# Patient Record
Sex: FEMALE | Race: WHITE | URBAN - METROPOLITAN AREA
[De-identification: names, ages, dates, MRNs, and addresses within clinical notes are randomized per-mention and may not be internally consistent; named-entity substitution may affect disease eponyms.]

---

## 2019-04-18 NOTE — PROGRESS NOTES
[address]   Cardiac Consultation    Referring Provider:  Roberto Marks MD       HPI:  Corry Gray is a 37 y.o. female who presents for evaluation of SVT. She was referred by Dr. Arslan Henderson. Her echocardiogram from 4/17/19 showed an EF of 54-58%. Her EKG from today, 4/19/19, shows sinus rhythm rate 62. She reports that on 2/20/2019 she felt that her heart rate was elevated. She went to [facility] to attempt to get her blood pressure reading. She was unable to get a reading, went to Urgent care who were also unable to get a blood pressure reading. They did note that her heart rate was >200. She then called the squad, was reportedly in SVT at 235 bpm and was given Adenosine and transported to [facility]. Unfortunately there are no EKG tracings available to confirm this. These symptoms have been going on for the last 6 years. She initially attributed these symptoms to anxiety and panic attacks. She would treat them with Ativan and wait for them to resolve. They have lasted anywhere from 4-8 hours, occurring once every 2 months. She does at times feel associated lightheadedness, dizziness and shortness of breath. When an episode begins, she at first begins to feel panicy, her heart pound hard once and then race. When her episodes initially began, without knowing that this would help, she would bear down as she felt she needed to have a bowel movement. She also would also sneeze or splash could water on her face. These maneuvers would temporarily stop her symptoms. She previously was very active, exercising regularly. She has stopped this due to fear of inducing her fast heart rate. She has cut out caffeine and plans to quit smoking. Patient currently denies any weight gain, edema, chest pain, shortness of breath, and syncope. Past Medical History:   has no past medical history on file. Surgical History:   has no past surgical history on file.      Social History:   reports that she has been smoking. She has been smoking about 1.00 pack per day. She has never used smokeless tobacco. She reports that she drank about 6.0 oz of alcohol per week. Family History:  family history includes Heart Failure in her maternal grandmother; Heart Surgery in her mother. Home Medications:  Outpatient Encounter Medications as of 4/19/2019   Medication Sig Dispense Refill    atenolol (TENORMIN) 25 MG tablet Take 12.5 mg by mouth daily       No facility-administered encounter medications on file as of 4/19/2019. Allergies:  Patient has no known allergies. Review of Systems   Constitutional: Negative. HENT: Negative. Eyes: Negative. Respiratory: Negative. Cardiovascular: Negative. Gastrointestinal: Negative. Genitourinary: Negative. Musculoskeletal: Negative. Skin: Negative. Neurological: Negative. Hematological: Negative. Psychiatric/Behavioral: Negative. BP 94/70   Pulse 75   Ht 5' 3\" (1.6 m)   Wt 163 lb 9.6 oz (74.2 kg)   SpO2 99%   BMI 28.98 kg/m²       Objective:  Physical Exam   Constitutional: She is oriented to person, place, and time. She appears well-developed and well-nourished. HENT:   Head: Normocephalic and atraumatic. Eyes: Pupils are equal, round, and reactive to light. Neck: Normal range of motion. Cardiovascular: Normal rate, regular rhythm and normal heart sounds. Pulmonary/Chest: Effort normal and breath sounds normal.   Abdominal: Soft. No tenderness. Musculoskeletal: Normal range of motion. She exhibits no edema. Neurological: She is alert and oriented to person, place, and time. Skin: Skin is warm and dry. Psychiatric: She has a normal mood and affect. Assessment:  1. Palpitations - her symptoms are characteristic of PSVT. Records from EMT 2/20/29 apparently demonstrated very rapid SVT which terminated with adenosine. She has fairly frequent recurrences of the arrhythmia which are sustained and highly symptomatic. Catheter ablation is the best treatment option in this setting. The procedure and procedure related risks were reviewed in detail and the pt will consider this. 2. Racing heart beat          Plan:  1. Based on the description of your symptoms and [facility], the administration of Adenosine and notes from [place], it does sound consistent with Supra ventricular tachycardia. 2. EPS/RFCA   3. Importance of smoking cessation discussed. 4. Follow up with me after procedure. This note was scribed in the presence of Destini Murray MD by Mirza Rivera RN.      I, Dr. Destini Murray, personally performed the services described in this documentation as scribed by Mirza Rivera RN in my presence, and it is both accurate and complete. QUALITY MEASURES  1. Tobacco Cessation Counseling: Yes  2. Retake of BP if >140/90:   NA  3. Documentation to PCP/referring for new patient:  Sent to PCP at close of office visit  4. CAD patient on anti-platelet: NA  5. CAD patient on STATIN therapy:  NA  6.  Patient with CHF and aFib on anticoagulation:  NA      Destini Murray M.D.

## 2019-04-19 ENCOUNTER — TELEPHONE (OUTPATIENT)
Dept: CARDIOLOGY CLINIC | Age: 34
End: 2019-04-19

## 2019-04-19 ENCOUNTER — OFFICE VISIT (OUTPATIENT)
Dept: CARDIOLOGY CLINIC | Age: 34
End: 2019-04-19
Payer: COMMERCIAL

## 2019-04-19 VITALS
HEIGHT: 63 IN | SYSTOLIC BLOOD PRESSURE: 94 MMHG | OXYGEN SATURATION: 99 % | WEIGHT: 163.6 LBS | BODY MASS INDEX: 28.99 KG/M2 | HEART RATE: 75 BPM | DIASTOLIC BLOOD PRESSURE: 70 MMHG

## 2019-04-19 DIAGNOSIS — R00.0 RACING HEART BEAT: ICD-10-CM

## 2019-04-19 DIAGNOSIS — R00.2 PALPITATIONS: Primary | ICD-10-CM

## 2019-04-19 PROCEDURE — 99204 OFFICE O/P NEW MOD 45 MIN: CPT | Performed by: INTERNAL MEDICINE

## 2019-04-19 PROCEDURE — 93000 ELECTROCARDIOGRAM COMPLETE: CPT | Performed by: INTERNAL MEDICINE

## 2019-04-19 RX ORDER — ATENOLOL 25 MG/1
12.5 TABLET ORAL DAILY
COMMUNITY

## 2019-04-19 NOTE — LETTER
[address]   Cardiac Consultation    Referring Provider:  Dewayne Peck MD       HPI:  Ivory Wood is a  [address] y.o. female who presents for evaluation of SVT. She was referred by Dr. Brianna Zhao. Her echocardiogram from 4/17/19 showed an EF of 54-58%. Her EKG from today, 4/19/19, shows sinus rhythm rate 62. She reports that on 2/20/2019 she felt that her heart rate was elevated. She went to [facility] to attempt to get her blood pressure reading. She was unable to get a reading, went to Urgent care who were also unable to get a blood pressure reading. They did note that her heart rate was >200. She then called the squad, was reportedly in SVT at 235 bpm and was given Adenosine and transported to [facility]. Unfortunately there are no EKG tracings available to confirm this. These symptoms have been going on for the last 6 years. She initially attributed these symptoms to anxiety and panic attacks. She would treat them with Ativan and wait for them to resolve. They have lasted anywhere from 4-8 hours, occurring once every 2 months. She does at times feel associated lightheadedness, dizziness and shortness of breath. When an episode begins, she at first begins to feel panicy, her heart pound hard once and then race. When her episodes initially began, without knowing that this would help, she would bear down as she felt she needed to have a bowel movement. She also would also sneeze or splash could water on her face. These maneuvers would temporarily stop her symptoms. She previously was very active, exercising regularly. She has stopped this due to fear of inducing her fast heart rate. She has cut out caffeine and plans to quit smoking. Patient currently denies any weight gain, edema, chest pain, shortness of breath, and syncope. Past Medical History:   has no past medical history on file. Surgical History:   has no past surgical history on file.      Social History: reports that she has been smoking. She has been smoking about 1.00 pack per day. She has never used smokeless tobacco. She reports that she drank about 6.0 oz of alcohol per week. Family History:  family history includes Heart Failure in her maternal grandmother; Heart Surgery in her mother. Home Medications:  Outpatient Encounter Medications as of 4/19/2019   Medication Sig Dispense Refill    atenolol (TENORMIN) 25 MG tablet Take 12.5 mg by mouth daily       No facility-administered encounter medications on file as of 4/19/2019. Allergies:  Patient has no known allergies. Review of Systems   Constitutional: Negative. HENT: Negative. Eyes: Negative. Respiratory: Negative. Cardiovascular: Negative. Gastrointestinal: Negative. Genitourinary: Negative. Musculoskeletal: Negative. Skin: Negative. Neurological: Negative. Hematological: Negative. Psychiatric/Behavioral: Negative. BP 94/70   Pulse 75   Ht 5' 3\" (1.6 m)   Wt 163 lb 9.6 oz (74.2 kg)   SpO2 99%   BMI 28.98 kg/m²        Objective:  Physical Exam   Constitutional: She is oriented to person, place, and time. She appears well-developed and well-nourished. HENT:   Head: Normocephalic and atraumatic. Eyes: Pupils are equal, round, and reactive to light. Neck: Normal range of motion. Cardiovascular: Normal rate, regular rhythm and normal heart sounds. Pulmonary/Chest: Effort normal and breath sounds normal.   Abdominal: Soft. No tenderness. Musculoskeletal: Normal range of motion. She exhibits no edema. Neurological: She is alert and oriented to person, place, and time. Skin: Skin is warm and dry. Psychiatric: She has a normal mood and affect. Assessment:  1. Palpitations - her symptoms are characteristic of PSVT. Records from EMT 2/20/29 apparently demonstrated very rapid SVT which terminated with adenosine.  She has fairly frequent recurrences of the arrhythmia which are sustained and highly symptomatic. Catheter ablation is the best treatment option in this setting. The procedure and procedure related risks were reviewed in detail and the pt will consider this. 2. Racing heart beat          Plan:  1. Based on the description of your symptoms and [facility] administration of Adenosine and notes from [place], it does sound consistent with Supra ventricular tachycardia. 2. EPS/RFCA   3. Importance of smoking cessation discussed. 4. Follow up with me after procedure. This note was scribed in the presence of Farida Gastelum MD by Elba De La Torre RN.      I, Dr. Farida Gastelum, personally performed the services described in this documentation as scribed by Elba De La Torre RN in my presence, and it is both accurate and complete. QUALITY MEASURES  1. Tobacco Cessation Counseling: Yes  2. Retake of BP if >140/90:   NA  3. Documentation to PCP/referring for new patient:  Sent to PCP at close of office visit  4. CAD patient on anti-platelet: NA  5. CAD patient on STATIN therapy:  NA  6.  Patient with CHF and aFib on anticoagulation:  NA      Farida Gastelum M.D.

## 2019-04-19 NOTE — TELEPHONE ENCOUNTER
Pt was seen today by Michelle De La Paz. She states she was evaluated and treated at [address] [place] [address],[address] on 2/20/2019. She states she had an EKG in the squad on the way there and while there. JENNIFER would like to attempt to get the tracings from the squad and while in the ER.

## 2020-09-18 NOTE — PATIENT INSTRUCTIONS
Plan:  1. Based on the description of your symptoms and [facility] the administration of Adenosine and notes from [place], it does sound consistent with Supra ventricular tachycardia. 2. I recommend you undergo a catheter ablation to attempt to correct this. 3. Importance of smoking cessation discussed. 4. Follow up with me after procedure. Female